# Patient Record
Sex: MALE | Race: WHITE | NOT HISPANIC OR LATINO | Employment: OTHER | URBAN - METROPOLITAN AREA
[De-identification: names, ages, dates, MRNs, and addresses within clinical notes are randomized per-mention and may not be internally consistent; named-entity substitution may affect disease eponyms.]

---

## 2023-11-02 ENCOUNTER — OFFICE VISIT (OUTPATIENT)
Dept: PRIMARY CARE | Facility: CLINIC | Age: 68
End: 2023-11-02
Payer: MEDICARE

## 2023-11-02 VITALS
HEIGHT: 72 IN | BODY MASS INDEX: 34.81 KG/M2 | OXYGEN SATURATION: 95 % | TEMPERATURE: 97.5 F | SYSTOLIC BLOOD PRESSURE: 137 MMHG | HEART RATE: 60 BPM | DIASTOLIC BLOOD PRESSURE: 81 MMHG | RESPIRATION RATE: 18 BRPM | WEIGHT: 257 LBS

## 2023-11-02 DIAGNOSIS — Z13.29 THYROID DISORDER SCREENING: ICD-10-CM

## 2023-11-02 DIAGNOSIS — Z12.5 SCREENING PSA (PROSTATE SPECIFIC ANTIGEN): ICD-10-CM

## 2023-11-02 DIAGNOSIS — E55.9 VITAMIN D DEFICIENCY: ICD-10-CM

## 2023-11-02 DIAGNOSIS — Z12.11 SCREENING FOR COLORECTAL CANCER: ICD-10-CM

## 2023-11-02 DIAGNOSIS — Z23 NEED FOR VACCINATION: ICD-10-CM

## 2023-11-02 DIAGNOSIS — R73.01 IFG (IMPAIRED FASTING GLUCOSE): ICD-10-CM

## 2023-11-02 DIAGNOSIS — Z12.12 SCREENING FOR COLORECTAL CANCER: ICD-10-CM

## 2023-11-02 DIAGNOSIS — Z13.220 SCREENING, LIPID: ICD-10-CM

## 2023-11-02 DIAGNOSIS — Z00.00 ROUTINE GENERAL MEDICAL EXAMINATION AT HEALTH CARE FACILITY: Primary | ICD-10-CM

## 2023-11-02 PROBLEM — K04.7 DENTAL INFECTION: Status: ACTIVE | Noted: 2023-11-02

## 2023-11-02 PROCEDURE — 1170F FXNL STATUS ASSESSED: CPT | Performed by: NURSE PRACTITIONER

## 2023-11-02 PROCEDURE — G0009 ADMIN PNEUMOCOCCAL VACCINE: HCPCS | Performed by: NURSE PRACTITIONER

## 2023-11-02 PROCEDURE — G0439 PPPS, SUBSEQ VISIT: HCPCS | Performed by: NURSE PRACTITIONER

## 2023-11-02 PROCEDURE — G0008 ADMIN INFLUENZA VIRUS VAC: HCPCS | Performed by: NURSE PRACTITIONER

## 2023-11-02 PROCEDURE — 90677 PCV20 VACCINE IM: CPT | Performed by: NURSE PRACTITIONER

## 2023-11-02 PROCEDURE — 90686 IIV4 VACC NO PRSV 0.5 ML IM: CPT | Performed by: NURSE PRACTITIONER

## 2023-11-02 PROCEDURE — 1159F MED LIST DOCD IN RCRD: CPT | Performed by: NURSE PRACTITIONER

## 2023-11-02 ASSESSMENT — ENCOUNTER SYMPTOMS
NAUSEA: 0
COUGH: 0
DEPRESSION: 0
LOSS OF SENSATION IN FEET: 0
WHEEZING: 0
ACTIVITY CHANGE: 0
CONSTITUTIONAL NEGATIVE: 1
CHILLS: 0
PALPITATIONS: 0
CONFUSION: 0
FEVER: 0
OCCASIONAL FEELINGS OF UNSTEADINESS: 0
ABDOMINAL PAIN: 0
RESPIRATORY NEGATIVE: 1
HEADACHES: 0
DIZZINESS: 0
WEAKNESS: 0
WOUND: 0
APNEA: 0
FATIGUE: 0
NERVOUS/ANXIOUS: 0
VOMITING: 0

## 2023-11-02 ASSESSMENT — ACTIVITIES OF DAILY LIVING (ADL)
BATHING: INDEPENDENT
DOING_HOUSEWORK: INDEPENDENT
MANAGING_FINANCES: INDEPENDENT
GROCERY_SHOPPING: INDEPENDENT
DRESSING: INDEPENDENT
TAKING_MEDICATION: INDEPENDENT

## 2023-11-02 ASSESSMENT — PATIENT HEALTH QUESTIONNAIRE - PHQ9
SUM OF ALL RESPONSES TO PHQ9 QUESTIONS 1 AND 2: 0
2. FEELING DOWN, DEPRESSED OR HOPELESS: NOT AT ALL
1. LITTLE INTEREST OR PLEASURE IN DOING THINGS: NOT AT ALL

## 2023-11-02 NOTE — PROGRESS NOTES
Subjective   Reason for Visit: Harris Leblanc is an 68 y.o. male here for a Medicare Wellness visit.          Reviewed all medications by prescribing practitioner or clinical pharmacist (such as prescriptions, OTCs, herbal therapies and supplements) and documented in the medical record.    No issues or concerns   BP slightly elevated, improved on recheck   Prevnar 20, influenza vaccine   Cologuard ordered        Patient Care Team:  GONZALEZ Badillo-CNP as PCP - General (Family Medicine)     Review of Systems   Constitutional: Negative.  Negative for activity change, chills, fatigue and fever.   Respiratory: Negative.  Negative for apnea, cough and wheezing.    Cardiovascular:  Negative for chest pain and palpitations.   Gastrointestinal:  Negative for abdominal pain, nausea and vomiting.   Skin:  Negative for rash and wound.        Has appt with derm for changing areas on back   Neurological:  Negative for dizziness, weakness and headaches.   Psychiatric/Behavioral:  Negative for confusion. The patient is not nervous/anxious.        Objective   Vitals:  /81   Pulse 60   Temp 36.4 °C (97.5 °F) (Temporal)   Resp 18   Ht 1.829 m (6')   Wt 117 kg (257 lb)   SpO2 95%   BMI 34.86 kg/m²       Physical Exam  Vitals reviewed.   Constitutional:       Appearance: Normal appearance.   Cardiovascular:      Rate and Rhythm: Normal rate and regular rhythm.      Pulses: Normal pulses.      Heart sounds: Normal heart sounds.   Pulmonary:      Effort: Pulmonary effort is normal.      Breath sounds: Normal breath sounds.   Neurological:      Mental Status: He is alert and oriented to person, place, and time.   Psychiatric:         Mood and Affect: Mood normal.         Behavior: Behavior normal.           Assessment/Plan   Problem List Items Addressed This Visit       Need for vaccination    Current Assessment & Plan     Prevnar 20 IM and influenza IM today            Relevant Orders    Flu vaccine, high dose  seasonal, preservative free    Pneumococcal conjugate vaccine 20-valent IM (Completed)    Routine general medical examination at health care facility - Primary    Current Assessment & Plan     Prevnar 20 IM, influenza-regular IM done today   Cologuard ordered   Follow up 6 months   Shingrix and tdap-encouraged to have done at pharmacy          Relevant Orders    CBC    Comprehensive Metabolic Panel     Other Visit Diagnoses       Screening for colorectal cancer        Relevant Orders    Cologuard® colon cancer screening    Screening, lipid        Relevant Orders    Lipid Panel    IFG (impaired fasting glucose)        Relevant Orders    CBC    Comprehensive Metabolic Panel    Hemoglobin A1C    Screening PSA (prostate specific antigen)        Relevant Orders    Prostate Specific Antigen, Screen    Thyroid disorder screening        Relevant Orders    TSH with reflex to Free T4 if abnormal    Vitamin D deficiency        Relevant Orders    Vitamin D 1,25 Dihydroxy (for eval of hypercalcemia)            Time Spent  Time spent directly with patient, family or caregiver: 30 minutes  Documentation Time: 10 minutes

## 2023-11-02 NOTE — ASSESSMENT & PLAN NOTE
Prevnar 20 IM, influenza-regular IM done today   Cologuard ordered   Follow up 6 months   Shingrix and tdap-encouraged to have done at pharmacy

## 2023-11-02 NOTE — PROGRESS NOTES
Subjective   Reason for Visit: Harris Leblanc is an 68 y.o. male here for a Medicare Wellness visit.          Reviewed all medications by prescribing practitioner or clinical pharmacist (such as prescriptions, OTCs, herbal therapies and supplements) and documented in the medical record.    HPI    Patient Care Team:  GONZALEZ Badillo-CNP as PCP - General (Family Medicine)     Review of Systems    Objective   Vitals:  /81   Pulse 60   Temp 36.4 °C (97.5 °F) (Temporal)   Resp 18   Ht 1.829 m (6')   Wt 117 kg (257 lb)   SpO2 95%   BMI 34.86 kg/m²       Physical Exam    Assessment/Plan   Problem List Items Addressed This Visit     Need for vaccination    Current Assessment & Plan     Prevnar 20 IM and influenza IM today            Relevant Orders    Pneumococcal conjugate vaccine 20-valent IM (Completed)    Flu vaccine (IIV4) greater than or equal to 3 years old, preservative free    Routine general medical examination at health care facility - Primary    Current Assessment & Plan     Prevnar 20 IM, influenza-regular IM done today   Cologuard ordered   Follow up 6 months   Shingrix and tdap-encouraged to have done at pharmacy          Relevant Orders    CBC    Comprehensive Metabolic Panel   Other Visit Diagnoses     Screening for colorectal cancer        Relevant Orders    Cologuard® colon cancer screening    Screening, lipid        Relevant Orders    Lipid Panel    IFG (impaired fasting glucose)        Relevant Orders    CBC    Comprehensive Metabolic Panel    Hemoglobin A1C    Screening PSA (prostate specific antigen)        Relevant Orders    Prostate Specific Antigen, Screen    Thyroid disorder screening        Relevant Orders    TSH with reflex to Free T4 if abnormal    Vitamin D deficiency        Relevant Orders    Vitamin D 1,25 Dihydroxy (for eval of hypercalcemia)          Time Spent  Time spent directly with patient, family or caregiver: 30 minutes  Documentation Time: 10 minutes

## 2023-11-02 NOTE — PROGRESS NOTES
Subjective   Reason for Visit: Harris Leblanc is an 68 y.o. male here for a Medicare Wellness visit.          Reviewed all medications by prescribing practitioner or clinical pharmacist (such as prescriptions, OTCs, herbal therapies and supplements) and documented in the medical record.    HPI    Patient Care Team:  PRAVEEN Badillo as PCP - General (Family Medicine)     Review of Systems    Objective   Vitals:  /82   Pulse 60   Temp 36.4 °C (97.5 °F) (Temporal)   Resp 18   Ht 1.829 m (6')   Wt 117 kg (257 lb)   SpO2 95%   BMI 34.86 kg/m²       Physical Exam    Assessment/Plan   Problem List Items Addressed This Visit    None  Visit Diagnoses     Routine general medical examination at health care facility    -  Primary    Need for vaccination        Relevant Orders    Flu vaccine, high dose seasonal, preservative free    Pneumococcal conjugate vaccine 20-valent IM    Screening for colorectal cancer        Relevant Orders    Cologuard® colon cancer screening          Time Spent  Time spent directly with patient, family or caregiver: 30 minutes  Documentation Time: 10 minutes

## 2023-11-02 NOTE — PATIENT INSTRUCTIONS
Please consider Boostrix and Shingrix vaccination with your local pharmacy.  These are pharmacy benefits with your Medicare plan.  Follow-up in 6 months for routine office visit  Have fasting labs done.  Will call for any issues or concerns with lab work  Today you received a regular dose flu vaccine and Prevnar 20 vaccination  Blood pressure was slightly elevated.  Please check at home and report any consistently elevated BP greater than 150/90.  No added salt diet.  Exercise 150 minutes a week  Please have dilated eye exam yearly and visit your dentist twice yearly

## 2023-11-14 ENCOUNTER — LAB (OUTPATIENT)
Dept: LAB | Facility: LAB | Age: 68
End: 2023-11-14
Payer: MEDICARE

## 2023-11-14 DIAGNOSIS — R73.01 IFG (IMPAIRED FASTING GLUCOSE): ICD-10-CM

## 2023-11-14 DIAGNOSIS — E55.9 VITAMIN D DEFICIENCY: ICD-10-CM

## 2023-11-14 DIAGNOSIS — Z00.00 ROUTINE GENERAL MEDICAL EXAMINATION AT HEALTH CARE FACILITY: ICD-10-CM

## 2023-11-14 DIAGNOSIS — Z13.220 SCREENING, LIPID: ICD-10-CM

## 2023-11-14 DIAGNOSIS — Z12.5 SCREENING PSA (PROSTATE SPECIFIC ANTIGEN): ICD-10-CM

## 2023-11-14 DIAGNOSIS — Z13.29 THYROID DISORDER SCREENING: ICD-10-CM

## 2023-11-14 LAB
ALBUMIN SERPL BCP-MCNC: 4.2 G/DL (ref 3.4–5)
ALP SERPL-CCNC: 71 U/L (ref 33–136)
ALT SERPL W P-5'-P-CCNC: 19 U/L (ref 10–52)
ANION GAP SERPL CALC-SCNC: 9 MMOL/L (ref 10–20)
AST SERPL W P-5'-P-CCNC: 17 U/L (ref 9–39)
BILIRUB SERPL-MCNC: 0.5 MG/DL (ref 0–1.2)
BUN SERPL-MCNC: 19 MG/DL (ref 6–23)
CALCIUM SERPL-MCNC: 9 MG/DL (ref 8.6–10.3)
CHLORIDE SERPL-SCNC: 103 MMOL/L (ref 98–107)
CHOLEST SERPL-MCNC: 151 MG/DL (ref 0–199)
CHOLESTEROL/HDL RATIO: 2.8
CO2 SERPL-SCNC: 31 MMOL/L (ref 21–32)
CREAT SERPL-MCNC: 0.91 MG/DL (ref 0.5–1.3)
ERYTHROCYTE [DISTWIDTH] IN BLOOD BY AUTOMATED COUNT: 13 % (ref 11.5–14.5)
EST. AVERAGE GLUCOSE BLD GHB EST-MCNC: 117 MG/DL
GFR SERPL CREATININE-BSD FRML MDRD: >90 ML/MIN/1.73M*2
GLUCOSE SERPL-MCNC: 97 MG/DL (ref 74–99)
HBA1C MFR BLD: 5.7 %
HCT VFR BLD AUTO: 46.1 % (ref 41–52)
HDLC SERPL-MCNC: 53.1 MG/DL
HGB BLD-MCNC: 15.4 G/DL (ref 13.5–17.5)
LDLC SERPL CALC-MCNC: 77 MG/DL
MCH RBC QN AUTO: 30.3 PG (ref 26–34)
MCHC RBC AUTO-ENTMCNC: 33.4 G/DL (ref 32–36)
MCV RBC AUTO: 91 FL (ref 80–100)
NON HDL CHOLESTEROL: 98 MG/DL (ref 0–149)
NRBC BLD-RTO: 0 /100 WBCS (ref 0–0)
PLATELET # BLD AUTO: 326 X10*3/UL (ref 150–450)
POTASSIUM SERPL-SCNC: 4.7 MMOL/L (ref 3.5–5.3)
PROT SERPL-MCNC: 6.7 G/DL (ref 6.4–8.2)
PSA SERPL-MCNC: 1.01 NG/ML
RBC # BLD AUTO: 5.08 X10*6/UL (ref 4.5–5.9)
SODIUM SERPL-SCNC: 138 MMOL/L (ref 136–145)
TRIGL SERPL-MCNC: 105 MG/DL (ref 0–149)
TSH SERPL-ACNC: 3.47 MIU/L (ref 0.44–3.98)
VLDL: 21 MG/DL (ref 0–40)
WBC # BLD AUTO: 6.6 X10*3/UL (ref 4.4–11.3)

## 2023-11-14 PROCEDURE — 80053 COMPREHEN METABOLIC PANEL: CPT

## 2023-11-14 PROCEDURE — 85027 COMPLETE CBC AUTOMATED: CPT

## 2023-11-14 PROCEDURE — 80061 LIPID PANEL: CPT

## 2023-11-14 PROCEDURE — 82652 VIT D 1 25-DIHYDROXY: CPT

## 2023-11-14 PROCEDURE — 83036 HEMOGLOBIN GLYCOSYLATED A1C: CPT

## 2023-11-14 PROCEDURE — 36415 COLL VENOUS BLD VENIPUNCTURE: CPT

## 2023-11-14 PROCEDURE — 84443 ASSAY THYROID STIM HORMONE: CPT

## 2023-11-14 PROCEDURE — G0103 PSA SCREENING: HCPCS

## 2023-11-17 LAB — 1,25(OH)2D3 SERPL-MCNC: 40.4 PG/ML (ref 19.9–79.3)

## 2023-11-28 LAB — NONINV COLON CA DNA+OCC BLD SCRN STL QL: NEGATIVE

## 2024-04-30 ENCOUNTER — OFFICE VISIT (OUTPATIENT)
Dept: PRIMARY CARE | Facility: CLINIC | Age: 69
End: 2024-04-30
Payer: MEDICARE

## 2024-04-30 VITALS
HEIGHT: 72 IN | BODY MASS INDEX: 33.75 KG/M2 | WEIGHT: 249.2 LBS | SYSTOLIC BLOOD PRESSURE: 123 MMHG | DIASTOLIC BLOOD PRESSURE: 80 MMHG | OXYGEN SATURATION: 93 % | HEART RATE: 71 BPM | RESPIRATION RATE: 18 BRPM | TEMPERATURE: 97.3 F

## 2024-04-30 DIAGNOSIS — Z13.29 THYROID DISORDER SCREEN: ICD-10-CM

## 2024-04-30 DIAGNOSIS — E66.9 CLASS 1 OBESITY WITHOUT SERIOUS COMORBIDITY WITH BODY MASS INDEX (BMI) OF 33.0 TO 33.9 IN ADULT, UNSPECIFIED OBESITY TYPE: ICD-10-CM

## 2024-04-30 DIAGNOSIS — Z13.220 LIPID SCREENING: ICD-10-CM

## 2024-04-30 DIAGNOSIS — R73.01 IFG (IMPAIRED FASTING GLUCOSE): ICD-10-CM

## 2024-04-30 DIAGNOSIS — R03.0 ELEVATED BP WITHOUT DIAGNOSIS OF HYPERTENSION: Primary | ICD-10-CM

## 2024-04-30 DIAGNOSIS — Z12.5 SCREENING PSA (PROSTATE SPECIFIC ANTIGEN): ICD-10-CM

## 2024-04-30 PROCEDURE — 1159F MED LIST DOCD IN RCRD: CPT | Performed by: NURSE PRACTITIONER

## 2024-04-30 PROCEDURE — 1124F ACP DISCUSS-NO DSCNMKR DOCD: CPT | Performed by: NURSE PRACTITIONER

## 2024-04-30 PROCEDURE — 3008F BODY MASS INDEX DOCD: CPT | Performed by: NURSE PRACTITIONER

## 2024-04-30 PROCEDURE — 99213 OFFICE O/P EST LOW 20 MIN: CPT | Performed by: NURSE PRACTITIONER

## 2024-04-30 ASSESSMENT — ENCOUNTER SYMPTOMS
APNEA: 0
CONSTITUTIONAL NEGATIVE: 1
ABDOMINAL PAIN: 0
RESPIRATORY NEGATIVE: 1
VOMITING: 0
DIZZINESS: 0
ACTIVITY CHANGE: 0
COUGH: 0
CHILLS: 0
FATIGUE: 0
FEVER: 0
NERVOUS/ANXIOUS: 0
HEADACHES: 0
WEAKNESS: 0
CONFUSION: 0
SHORTNESS OF BREATH: 0
NAUSEA: 0
PALPITATIONS: 0

## 2024-04-30 NOTE — PROGRESS NOTES
Subjective   Patient ID: Harris Leblanc is a 68 y.o. male who presents for Hypertension and Impaired Fasting Glucose.    Slightly elevated bp noted at last visit. Patient worked on exercise. BP now improved.     IF.7%, working on diet/            Review of Systems   Constitutional: Negative.  Negative for activity change, chills, fatigue and fever.   Respiratory: Negative.  Negative for apnea, cough and shortness of breath.    Cardiovascular:  Negative for chest pain and palpitations.   Gastrointestinal:  Negative for abdominal pain, nausea and vomiting.   Neurological:  Negative for dizziness, weakness and headaches.   Psychiatric/Behavioral:  Negative for confusion. The patient is not nervous/anxious.        Objective   /80   Pulse 71   Temp 36.3 °C (97.3 °F) (Temporal)   Resp 18   Ht 1.829 m (6')   Wt 113 kg (249 lb 3.2 oz)   SpO2 93%   BMI 33.80 kg/m²     Physical Exam  Vitals reviewed.   Constitutional:       Appearance: He is obese.   HENT:      Head: Normocephalic.   Cardiovascular:      Rate and Rhythm: Normal rate and regular rhythm.      Pulses: Normal pulses.      Heart sounds: Normal heart sounds.   Pulmonary:      Effort: Pulmonary effort is normal. No respiratory distress.      Breath sounds: Normal breath sounds. No wheezing.   Abdominal:      General: Bowel sounds are normal.   Neurological:      General: No focal deficit present.      Mental Status: He is alert and oriented to person, place, and time.   Psychiatric:         Mood and Affect: Mood normal.         Behavior: Behavior normal.         Assessment/Plan   Problem List Items Addressed This Visit             ICD-10-CM    Elevated BP without diagnosis of hypertension - Primary R03.0     Work on diet/ exercise   ZAC diet   Call for consistently elevated bp >140/90         Relevant Orders    CBC and Auto Differential    Comprehensive Metabolic Panel    Hemoglobin A1C    Lipid Panel    Obesity E66.9     Work on diet/  exercise   Follow up 6 months          Relevant Orders    CBC and Auto Differential    Comprehensive Metabolic Panel    Hemoglobin A1C    Lipid Panel    IFG (impaired fasting glucose) R73.01     Work on diet/ exercise   .  Lab Results   Component Value Date    HGBA1C 5.7 (H) 11/14/2023             Relevant Orders    Comprehensive Metabolic Panel    Hemoglobin A1C     Other Visit Diagnoses         Codes    Thyroid disorder screen     Z13.29    Relevant Orders    TSH with reflex to Free T4 if abnormal    Screening PSA (prostate specific antigen)     Z12.5    Relevant Orders    Prostate Specific Antigen, Screen    Lipid screening     Z13.220    Relevant Orders    Lipid Panel

## 2024-10-28 ENCOUNTER — LAB (OUTPATIENT)
Dept: LAB | Facility: LAB | Age: 69
End: 2024-10-28
Payer: MEDICARE

## 2024-10-28 DIAGNOSIS — E66.811 CLASS 1 OBESITY WITHOUT SERIOUS COMORBIDITY WITH BODY MASS INDEX (BMI) OF 33.0 TO 33.9 IN ADULT, UNSPECIFIED OBESITY TYPE: ICD-10-CM

## 2024-10-28 DIAGNOSIS — R73.01 IFG (IMPAIRED FASTING GLUCOSE): ICD-10-CM

## 2024-10-28 DIAGNOSIS — Z13.220 LIPID SCREENING: ICD-10-CM

## 2024-10-28 DIAGNOSIS — R03.0 ELEVATED BP WITHOUT DIAGNOSIS OF HYPERTENSION: ICD-10-CM

## 2024-10-28 DIAGNOSIS — Z13.29 THYROID DISORDER SCREEN: ICD-10-CM

## 2024-10-28 DIAGNOSIS — Z12.5 SCREENING PSA (PROSTATE SPECIFIC ANTIGEN): ICD-10-CM

## 2024-10-28 LAB
ALBUMIN SERPL BCP-MCNC: 4 G/DL (ref 3.4–5)
ALP SERPL-CCNC: 64 U/L (ref 33–136)
ALT SERPL W P-5'-P-CCNC: 16 U/L (ref 10–52)
ANION GAP SERPL CALC-SCNC: 9 MMOL/L (ref 10–20)
AST SERPL W P-5'-P-CCNC: 18 U/L (ref 9–39)
BASOPHILS # BLD AUTO: 0.08 X10*3/UL (ref 0–0.1)
BASOPHILS NFR BLD AUTO: 1.1 %
BILIRUB SERPL-MCNC: 0.5 MG/DL (ref 0–1.2)
BUN SERPL-MCNC: 15 MG/DL (ref 6–23)
CALCIUM SERPL-MCNC: 9 MG/DL (ref 8.6–10.3)
CHLORIDE SERPL-SCNC: 103 MMOL/L (ref 98–107)
CHOLEST SERPL-MCNC: 140 MG/DL (ref 0–199)
CHOLESTEROL/HDL RATIO: 2.5
CO2 SERPL-SCNC: 32 MMOL/L (ref 21–32)
CREAT SERPL-MCNC: 0.91 MG/DL (ref 0.5–1.3)
EGFRCR SERPLBLD CKD-EPI 2021: >90 ML/MIN/1.73M*2
EOSINOPHIL # BLD AUTO: 0.75 X10*3/UL (ref 0–0.7)
EOSINOPHIL NFR BLD AUTO: 10.6 %
ERYTHROCYTE [DISTWIDTH] IN BLOOD BY AUTOMATED COUNT: 12.9 % (ref 11.5–14.5)
EST. AVERAGE GLUCOSE BLD GHB EST-MCNC: 111 MG/DL
GLUCOSE SERPL-MCNC: 94 MG/DL (ref 74–99)
HBA1C MFR BLD: 5.5 %
HCT VFR BLD AUTO: 47.7 % (ref 41–52)
HDLC SERPL-MCNC: 56.5 MG/DL
HGB BLD-MCNC: 16 G/DL (ref 13.5–17.5)
IMM GRANULOCYTES # BLD AUTO: 0.03 X10*3/UL (ref 0–0.7)
IMM GRANULOCYTES NFR BLD AUTO: 0.4 % (ref 0–0.9)
LDLC SERPL CALC-MCNC: 65 MG/DL
LYMPHOCYTES # BLD AUTO: 1.54 X10*3/UL (ref 1.2–4.8)
LYMPHOCYTES NFR BLD AUTO: 21.7 %
MCH RBC QN AUTO: 30.6 PG (ref 26–34)
MCHC RBC AUTO-ENTMCNC: 33.5 G/DL (ref 32–36)
MCV RBC AUTO: 91 FL (ref 80–100)
MONOCYTES # BLD AUTO: 0.57 X10*3/UL (ref 0.1–1)
MONOCYTES NFR BLD AUTO: 8 %
NEUTROPHILS # BLD AUTO: 4.12 X10*3/UL (ref 1.2–7.7)
NEUTROPHILS NFR BLD AUTO: 58.2 %
NON HDL CHOLESTEROL: 84 MG/DL (ref 0–149)
NRBC BLD-RTO: 0 /100 WBCS (ref 0–0)
PLATELET # BLD AUTO: 323 X10*3/UL (ref 150–450)
POTASSIUM SERPL-SCNC: 5.3 MMOL/L (ref 3.5–5.3)
PROT SERPL-MCNC: 6.5 G/DL (ref 6.4–8.2)
PSA SERPL-MCNC: 0.94 NG/ML
RBC # BLD AUTO: 5.23 X10*6/UL (ref 4.5–5.9)
SODIUM SERPL-SCNC: 139 MMOL/L (ref 136–145)
T4 FREE SERPL-MCNC: 0.79 NG/DL (ref 0.61–1.12)
TRIGL SERPL-MCNC: 91 MG/DL (ref 0–149)
TSH SERPL-ACNC: 4.66 MIU/L (ref 0.44–3.98)
VLDL: 18 MG/DL (ref 0–40)
WBC # BLD AUTO: 7.1 X10*3/UL (ref 4.4–11.3)

## 2024-10-28 PROCEDURE — 36415 COLL VENOUS BLD VENIPUNCTURE: CPT

## 2024-11-05 ENCOUNTER — APPOINTMENT (OUTPATIENT)
Dept: PRIMARY CARE | Facility: CLINIC | Age: 69
End: 2024-11-05
Payer: MEDICARE

## 2024-11-05 VITALS
HEART RATE: 70 BPM | OXYGEN SATURATION: 92 % | WEIGHT: 238 LBS | RESPIRATION RATE: 16 BRPM | DIASTOLIC BLOOD PRESSURE: 77 MMHG | BODY MASS INDEX: 32.28 KG/M2 | TEMPERATURE: 97.5 F | SYSTOLIC BLOOD PRESSURE: 128 MMHG

## 2024-11-05 DIAGNOSIS — Z00.00 MEDICARE ANNUAL WELLNESS VISIT, SUBSEQUENT: Primary | ICD-10-CM

## 2024-11-05 DIAGNOSIS — J30.2 SEASONAL ALLERGIES: ICD-10-CM

## 2024-11-05 DIAGNOSIS — Z23 NEED FOR VACCINATION: ICD-10-CM

## 2024-11-05 DIAGNOSIS — Z13.220 LIPID SCREENING: ICD-10-CM

## 2024-11-05 DIAGNOSIS — E03.8 SUBCLINICAL HYPOTHYROIDISM: ICD-10-CM

## 2024-11-05 DIAGNOSIS — R73.01 IFG (IMPAIRED FASTING GLUCOSE): ICD-10-CM

## 2024-11-05 PROCEDURE — 1160F RVW MEDS BY RX/DR IN RCRD: CPT | Performed by: NURSE PRACTITIONER

## 2024-11-05 PROCEDURE — 90662 IIV NO PRSV INCREASED AG IM: CPT | Performed by: NURSE PRACTITIONER

## 2024-11-05 PROCEDURE — G0008 ADMIN INFLUENZA VIRUS VAC: HCPCS | Performed by: NURSE PRACTITIONER

## 2024-11-05 PROCEDURE — 1124F ACP DISCUSS-NO DSCNMKR DOCD: CPT | Performed by: NURSE PRACTITIONER

## 2024-11-05 PROCEDURE — G0439 PPPS, SUBSEQ VISIT: HCPCS | Performed by: NURSE PRACTITIONER

## 2024-11-05 PROCEDURE — 1159F MED LIST DOCD IN RCRD: CPT | Performed by: NURSE PRACTITIONER

## 2024-11-05 RX ORDER — LORATADINE 10 MG
10 TABLET,DISINTEGRATING ORAL CONTINUOUS PRN
COMMUNITY

## 2024-11-05 ASSESSMENT — ENCOUNTER SYMPTOMS
CONSTITUTIONAL NEGATIVE: 1
VOMITING: 0
PALPITATIONS: 0
COUGH: 0
ABDOMINAL PAIN: 0
SHORTNESS OF BREATH: 0
NAUSEA: 0
APNEA: 0
RESPIRATORY NEGATIVE: 1
CONFUSION: 0
FATIGUE: 0
FEVER: 0
CHILLS: 0
HEADACHES: 0
ACTIVITY CHANGE: 0
WEAKNESS: 0
NERVOUS/ANXIOUS: 0
DIZZINESS: 0

## 2024-11-05 ASSESSMENT — ANXIETY QUESTIONNAIRES
1. FEELING NERVOUS, ANXIOUS, OR ON EDGE: NOT AT ALL
4. TROUBLE RELAXING: NOT AT ALL
6. BECOMING EASILY ANNOYED OR IRRITABLE: NOT AT ALL
5. BEING SO RESTLESS THAT IT IS HARD TO SIT STILL: NOT AT ALL
GAD7 TOTAL SCORE: 0
IF YOU CHECKED OFF ANY PROBLEMS ON THIS QUESTIONNAIRE, HOW DIFFICULT HAVE THESE PROBLEMS MADE IT FOR YOU TO DO YOUR WORK, TAKE CARE OF THINGS AT HOME, OR GET ALONG WITH OTHER PEOPLE: NOT DIFFICULT AT ALL
3. WORRYING TOO MUCH ABOUT DIFFERENT THINGS: NOT AT ALL
7. FEELING AFRAID AS IF SOMETHING AWFUL MIGHT HAPPEN: NOT AT ALL
2. NOT BEING ABLE TO STOP OR CONTROL WORRYING: NOT AT ALL

## 2024-11-05 ASSESSMENT — PATIENT HEALTH QUESTIONNAIRE - PHQ9
SUM OF ALL RESPONSES TO PHQ9 QUESTIONS 1 AND 2: 0
1. LITTLE INTEREST OR PLEASURE IN DOING THINGS: NOT AT ALL
2. FEELING DOWN, DEPRESSED OR HOPELESS: NOT AT ALL

## 2024-11-05 NOTE — ASSESSMENT & PLAN NOTE
Much improved A1c     Lab Results   Component Value Date    HGBA1C 5.5 10/28/2024        Orders:    Hemoglobin A1C; Future

## 2024-11-05 NOTE — ASSESSMENT & PLAN NOTE
Asymptomatic   Recheck in 6 weeks     Orders:    CBC and Auto Differential; Future    Comprehensive Metabolic Panel; Future    TSH with reflex to Free T4 if abnormal; Future    Tsh With Reflex To Free T4 If Abnormal; Future

## 2024-11-05 NOTE — ASSESSMENT & PLAN NOTE
Flu shot given    Orders:    Flu vaccine, trivalent, preservative free, HIGH-DOSE, age 65y+ (Fluzone)

## 2024-11-05 NOTE — PROGRESS NOTES
Subjective   Reason for Visit: Harris Leblanc is an 69 y.o. male here for a Medicare Wellness visit.     Past Medical, Surgical, and Family History reviewed and updated in chart.    Reviewed all medications by prescribing practitioner or clinical pharmacist (such as prescriptions, OTCs, herbal therapies and supplements) and documented in the medical record.    Annual Medicare well visit         Patient Care Team:  GONZALEZ Badillo-CNP as PCP - General (Family Medicine)     Review of Systems   Constitutional: Negative.  Negative for activity change, chills, fatigue and fever.   Respiratory: Negative.  Negative for apnea, cough and shortness of breath.    Cardiovascular:  Negative for chest pain and palpitations.   Gastrointestinal:  Negative for abdominal pain, nausea and vomiting.   Neurological:  Negative for dizziness, weakness and headaches.   Psychiatric/Behavioral:  Negative for confusion. The patient is not nervous/anxious.        Objective   Vitals:  /77   Pulse 70   Temp 36.4 °C (97.5 °F)   Resp 16   Wt 108 kg (238 lb)   SpO2 92%   BMI 32.28 kg/m²       Physical Exam  Constitutional:       Appearance: Normal appearance.   HENT:      Head: Normocephalic.   Cardiovascular:      Rate and Rhythm: Normal rate and regular rhythm.      Pulses: Normal pulses.      Heart sounds: Normal heart sounds.   Pulmonary:      Effort: Pulmonary effort is normal. No respiratory distress.      Breath sounds: Normal breath sounds. No wheezing.   Abdominal:      General: Bowel sounds are normal.   Neurological:      General: No focal deficit present.      Mental Status: He is alert and oriented to person, place, and time.   Psychiatric:         Mood and Affect: Mood normal.         Behavior: Behavior normal.         Assessment & Plan  Need for vaccination  Flu shot given    Orders:    Flu vaccine, trivalent, preservative free, HIGH-DOSE, age 65y+ (Fluzone)    IFG (impaired fasting glucose)  Much improved A1c      Lab Results   Component Value Date    HGBA1C 5.5 10/28/2024        Orders:    Hemoglobin A1C; Future    Subclinical hypothyroidism  Asymptomatic   Recheck in 6 weeks     Orders:    CBC and Auto Differential; Future    Comprehensive Metabolic Panel; Future    TSH with reflex to Free T4 if abnormal; Future    Tsh With Reflex To Free T4 If Abnormal; Future    Lipid screening    Orders:    Lipid Panel; Future    Medicare annual wellness visit, subsequent    Orders:    1 Year Follow Up In Primary Care - Wellness Exam; Future    Seasonal allergies  Continue otc antihistamine

## 2025-11-06 ENCOUNTER — APPOINTMENT (OUTPATIENT)
Dept: PRIMARY CARE | Facility: CLINIC | Age: 70
End: 2025-11-06
Payer: MEDICARE